# Patient Record
(demographics unavailable — no encounter records)

---

## 2024-10-07 NOTE — HISTORY OF PRESENT ILLNESS
[de-identified] : Patient presents today with left hip pain which started 10-14 days with no specific injury. Prior treatments: rest quality: sharp  Pain radiates down leg  Aggravating factors: walking, weight bearing Established patient returns today with a new complaint of left low back lateral hip and groin pain.  Is ongoing for about 10 days recalls no specific accident or injury he denies any change in bowel or bladder habits or any obvious weakness or numbness in the left lower extremity. pain level: 1-2/10

## 2024-10-07 NOTE — PHYSICAL EXAM
[de-identified] : Patient's exam today she he has full passive internal/external rotation left hip held 9 degrees no restriction mechanical block or pain.  There are some vague tenderness palpation over the lateral aspect of the hip and some mild tenderness to palpation of the lateral paraspinal musculature of the left-sided of the lumbar spine.  Negative straight leg raising test. [de-identified] : Hip radiographs were ordered today AP and lateral both hips were obtained showing well-preserved joint space in both hips with no evidence of joint space narrowing arthritis or any acute trauma injury or fracture.

## 2024-10-07 NOTE — DISCUSSION/SUMMARY
[de-identified] : Patient I discussed at length the probable underlying etiology of his current discomfort.  Patient's clinical exam radiographs and history all consistent with diagnosis of left-sided lumbar radiculopathy.  I indicated the patient that these episodes are usually self-limiting but he could have symptoms for another 4 to 6 weeks if left untreated.  At this point would recommend the use of Celebrex 200 mg p.o. twice daily for 4-day course then to be taken thereafter as needed.  The reasonable risk and benefits of medication were discussed in detail including potential side effects patient's taken this medication in the past with no untoward effects.  We also once again reviewed his current medication profile and appears to be no relative current contraindication to its current limited use.  Patient will follow-up in 10 days to 2 weeks time if not thoroughly improved for further evaluation and treatment options.  This consultation lasted 30 minutes.

## 2024-11-08 NOTE — DISCUSSION/SUMMARY
[de-identified] : Patient I talked at length about the underlying etiology of his current symptoms.  He has a fairly benign bilateral hip exam.  Patient has a history and clinical exam all suggestive of lumbar radiculopathy now affecting both sides but still more than the right.  He did see significant improvement with the previous Medrol Dosepak so we recommend he repeat a Medrol Dosepak we also will be sending for an MRI of the lumbar spine have evaluated in terms of stenosis.  We will notify the patient of the results once they are available for review.  This consultation lasted 30 minutes.

## 2024-11-08 NOTE — PHYSICAL EXAM
[de-identified] : Patient's exam today she he has full passive internal/external rotation left And right hip held 90 degrees no restriction mechanical block or pain.  There are some vague tenderness palpation over the lateral aspect of the hip and some mild tenderness to palpation of the lateral paraspinal musculature of the left-sided of the lumbar spine.  Negative straight leg raising test.

## 2024-11-08 NOTE — REASON FOR VISIT
[Follow-Up Visit] : a follow-up visit for [Hip Pain] : hip pain [FreeTextEntry2] : LEFT HIP PAIN AND LEFT LUMBAR RADICULOPATHY. TOOK THE MEDICATION LAST VISIT AND STATES THE PAIN CAME BACK WORSE WHEN HE WAS DONE TAKING IT.

## 2024-11-08 NOTE — HISTORY OF PRESENT ILLNESS
[de-identified] : Patient returns todayHe was seen most recently October 7 1 month ago at that point he was diagnosed with right-sided lumbar radiculopathy placed on a Medrol Dosepak which she states afforded him significant and immediate symptomatic relief but shortly after discontinuing the medication he has now developed the pain once again and to some degree left-sided hip and groin pain as well.  He denies any change in bowel or bladder habits or any obvious weakness or numbness in her right or left lower extremity.

## 2024-11-11 NOTE — HISTORY OF PRESENT ILLNESS
[de-identified] : PATIENT COMPLAINS OF RIGHT JASSONLER OCTOBER 2024    WHAT IS YOUR DOMINANT HAND - RHD  PAIN BEGAN 3 WEEKS AGO RELATED INJURY / ACCIDENT/NO SPECIFIC INJURY - NONE  RADIATES DOWN ARM  IS YOUR PROBLEM GETTING WORSE - NO  IS THIS WORKERS COMPENSATION INJURY/ NO FAULT: NO  PAIN IS INTERMITTENT PAIN    WITHOUT MOVEMENT 0/10 WITH MOVEMENT 5/10 DESCRIPTION OF PAIN: SHARP WORSE AT PARTICULAR TIME OF DAY: NO PRIOR/CURRENT TREATMENTS: METHYLIPREDNISONE - FOR THE HIP, ASPIRIN AND ALEVE - HELPFUL AGGRAVATING FACTORS:  LIFTING VARIOUS OBJECTS  ANY SYMPTOMS: LIMITED RANGE OF MOTION ASSOCIATED CLICKING  PREVIOUS DISLOCATION- NO HAS HAD PHYSICAL THERAPY WITHOUT RELIEF- YES (FOR SHOULDER, KNEE AND ELBOW - NOT HELPFUL) HAS HAD PREVIOUS SURGERY- MENISCUS SURGERY  HAS HAD PREVIOUS INJECTION - YES - HELPFUL HAS HAD PREVIOUS IMAGING - NO

## 2024-11-11 NOTE — DISCUSSION/SUMMARY
[de-identified] : ULTRASOUND SHOULDER PERFORMED , EVALUATED, DOCUMENTED - AND REVIEWED WITH PATIENT EVALUATION  REVEALS INFLAMMATORY CHANGES WITHOUT SIGNIFICANT TENDON TEAR  PATIENT HAS ELECTED TO PROCEED WITH KENALOG INJECTION SHOULDER RISKS AND BENEFITS DISCUSSED - VERBAL CONSENT OBTAINED SEE PROCEDURE NOTE     POST INJECTION INSTRUCTIONS:   INJECTION THERAPY HANDOUT PROVIDED   COLD THERAPY , ANALGESICS PRN   HOME  EXERCISES QD -  PENDULUM AND ROM  HANDOUT PROVIDED, REVIEWED AND DEMONSTRATED - REFERRED TO INSTRUCTIONAL VIDEO ON MY WEBSITE    START P.T.  WITHIN 2 WEEKS AFTER INJECTION - 2 X 4 WEEKS - PROGRESS TO HOME EXERCISES   CONSIDER REPEAT INJECTION AFTER P.T.    MRI IF NO RELIEF 12 WEEKS

## 2024-11-11 NOTE — PHYSICAL EXAM
[de-identified] : PHYSICAL EXAM  RIGHT  SHOULDER  NECK EXAM  FROM NONTENDER  SPURLING  RIGHT=NEG, LEFT=NEG  NORMAL POSTURE AROM 90 / 90 70 / 20 TENDER: SA REGION LATERAL  SPECIAL TESTING : STERLING - POSITIVE  YUMIKO - POSITIVE  SPEED TEST - POSITIVE  HICKS - NEGATIVE  APPREHENSION AND SUPPRESSION - NEGATIVE   RC STRENGTH TESTING  SS:  5/5 SUB 5/5 IS     5/5 BICEPS  5/5  SENSATION  - GROSSLY INTACT   [de-identified] : RIGHT SHOULDER XRAY (2 VIEWS - AP AND OUTLET) -   NO OBVIOUS FRACTURE ,  SEPARATION OR DISLOCATION  GRADE 1 OSTEOARTHRITIS, TYPE 2B ACROMION  CSA= 33

## 2025-02-24 NOTE — PHYSICAL EXAM
GYN [de-identified] : PHYSICAL EXAM  RIGHT  SHOULDER   NORMAL POSTURE AROM 90 / 90 70 / 20 TENDER: SA REGION LATERAL  SPECIAL TESTING : STERLING - POSITIVE  YUMIKO - POSITIVE  SPEED TEST - POSITIVE  HICKS - NEGATIVE  APPREHENSION AND SUPPRESSION - NEGATIVE   RC STRENGTH TESTING  SS:  5/5 SUB 5/5 IS     5/5 BICEPS  5/5  SENSATION  - GROSSLY INTACT

## 2025-02-24 NOTE — HISTORY OF PRESENT ILLNESS
[de-identified] : RIGHT RUFUS RPAIN  FOLLOW UP  PAIN LEVEL: 6/10  NOVEMBER 11, 2024- PREVIOUS CORTISONE INJ-NOT HELPFUL      PREVIOUS HPI PATIENT COMPLAINS OF RIGHT SHOUDLER OCTOBER 2024    WHAT IS YOUR DOMINANT HAND - RHD  PAIN BEGAN 3 WEEKS AGO RELATED INJURY / ACCIDENT/NO SPECIFIC INJURY - NONE  RADIATES DOWN ARM  IS YOUR PROBLEM GETTING WORSE - NO  IS THIS WORKERS COMPENSATION INJURY/ NO FAULT: NO  PAIN IS INTERMITTENT PAIN    WITHOUT MOVEMENT 0/10 WITH MOVEMENT 5/10 DESCRIPTION OF PAIN: SHARP WORSE AT PARTICULAR TIME OF DAY: NO PRIOR/CURRENT TREATMENTS: METHYLIPREDNISONE - FOR THE HIP, ASPIRIN AND ALEVE - HELPFUL AGGRAVATING FACTORS:  LIFTING VARIOUS OBJECTS  ANY SYMPTOMS: LIMITED RANGE OF MOTION ASSOCIATED CLICKING  PREVIOUS DISLOCATION- NO HAS HAD PHYSICAL THERAPY WITHOUT RELIEF- YES (FOR SHOULDER, KNEE AND ELBOW - NOT HELPFUL) HAS HAD PREVIOUS SURGERY- MENISCUS SURGERY  HAS HAD PREVIOUS INJECTION - YES - HELPFUL HAS HAD PREVIOUS IMAGING - NO

## 2025-02-24 NOTE — HISTORY OF PRESENT ILLNESS
[de-identified] : RIGHT RUFUS RPAIN  FOLLOW UP  PAIN LEVEL: 6/10  NOVEMBER 11, 2024- PREVIOUS CORTISONE INJ-NOT HELPFUL      PREVIOUS HPI PATIENT COMPLAINS OF RIGHT SHOUDLER OCTOBER 2024    WHAT IS YOUR DOMINANT HAND - RHD  PAIN BEGAN 3 WEEKS AGO RELATED INJURY / ACCIDENT/NO SPECIFIC INJURY - NONE  RADIATES DOWN ARM  IS YOUR PROBLEM GETTING WORSE - NO  IS THIS WORKERS COMPENSATION INJURY/ NO FAULT: NO  PAIN IS INTERMITTENT PAIN    WITHOUT MOVEMENT 0/10 WITH MOVEMENT 5/10 DESCRIPTION OF PAIN: SHARP WORSE AT PARTICULAR TIME OF DAY: NO PRIOR/CURRENT TREATMENTS: METHYLIPREDNISONE - FOR THE HIP, ASPIRIN AND ALEVE - HELPFUL AGGRAVATING FACTORS:  LIFTING VARIOUS OBJECTS  ANY SYMPTOMS: LIMITED RANGE OF MOTION ASSOCIATED CLICKING  PREVIOUS DISLOCATION- NO HAS HAD PHYSICAL THERAPY WITHOUT RELIEF- YES (FOR SHOULDER, KNEE AND ELBOW - NOT HELPFUL) HAS HAD PREVIOUS SURGERY- MENISCUS SURGERY  HAS HAD PREVIOUS INJECTION - YES - HELPFUL HAS HAD PREVIOUS IMAGING - NO

## 2025-02-24 NOTE — PHYSICAL EXAM
[de-identified] : PHYSICAL EXAM  RIGHT  SHOULDER   NORMAL POSTURE AROM 90 / 90 70 / 20 TENDER: SA REGION LATERAL  SPECIAL TESTING : STERLING - POSITIVE  YUMIKO - POSITIVE  SPEED TEST - POSITIVE  HICKS - NEGATIVE  APPREHENSION AND SUPPRESSION - NEGATIVE   RC STRENGTH TESTING  SS:  5/5 SUB 5/5 IS     5/5 BICEPS  5/5  SENSATION  - GROSSLY INTACT